# Patient Record
Sex: FEMALE | Race: WHITE | NOT HISPANIC OR LATINO | Employment: OTHER | URBAN - METROPOLITAN AREA
[De-identification: names, ages, dates, MRNs, and addresses within clinical notes are randomized per-mention and may not be internally consistent; named-entity substitution may affect disease eponyms.]

---

## 2023-09-22 PROBLEM — I82.90 VENOUS THROMBOSIS: Status: ACTIVE | Noted: 2023-09-22

## 2023-09-22 PROBLEM — H60.543 ECZEMA OF BOTH EXTERNAL EARS: Status: ACTIVE | Noted: 2023-09-22

## 2023-09-22 PROBLEM — I10 ESSENTIAL HYPERTENSION: Status: ACTIVE | Noted: 2023-09-22

## 2023-09-22 PROBLEM — I34.1 MITRAL VALVE PROLAPSE: Status: ACTIVE | Noted: 2023-09-22

## 2023-09-22 PROBLEM — E83.52 HYPERCALCEMIA: Status: ACTIVE | Noted: 2023-09-22

## 2023-09-22 PROBLEM — E21.0 HYPERPARATHYROIDISM, PRIMARY (MULTI): Status: ACTIVE | Noted: 2023-09-22

## 2023-09-22 PROBLEM — K21.9 GASTROESOPHAGEAL REFLUX DISEASE: Status: ACTIVE | Noted: 2023-09-22

## 2023-09-22 PROBLEM — I87.2 PERIPHERAL VENOUS INSUFFICIENCY: Status: ACTIVE | Noted: 2023-09-22

## 2023-09-22 PROBLEM — R42 VERTIGO: Status: ACTIVE | Noted: 2023-09-22

## 2023-09-22 PROBLEM — R00.2 PALPITATIONS: Status: ACTIVE | Noted: 2023-09-22

## 2023-09-22 PROBLEM — C73 THYROID CARCINOMA (MULTI): Status: ACTIVE | Noted: 2023-09-22

## 2023-09-22 PROBLEM — M47.12 CERVICAL ARTHRITIS WITH MYELOPATHY: Status: ACTIVE | Noted: 2023-09-22

## 2023-09-22 RX ORDER — NAPROXEN SODIUM 220 MG/1
TABLET, FILM COATED ORAL
COMMUNITY

## 2023-09-22 RX ORDER — OXYCODONE AND ACETAMINOPHEN 5; 325 MG/1; MG/1
1 TABLET ORAL EVERY 4 HOURS PRN
COMMUNITY

## 2023-09-22 RX ORDER — ACETAMINOPHEN 500 MG
1 TABLET ORAL DAILY
COMMUNITY

## 2023-09-22 RX ORDER — LANOLIN ALCOHOL/MO/W.PET/CERES
1 CREAM (GRAM) TOPICAL DAILY
COMMUNITY

## 2023-09-22 RX ORDER — SIMVASTATIN 20 MG/1
TABLET, FILM COATED ORAL
COMMUNITY

## 2023-09-22 RX ORDER — LOSARTAN POTASSIUM 50 MG/1
TABLET ORAL
COMMUNITY

## 2023-09-22 RX ORDER — ATENOLOL 50 MG/1
TABLET ORAL
COMMUNITY

## 2023-09-22 RX ORDER — ASPIRIN 325 MG
TABLET, DELAYED RELEASE (ENTERIC COATED) ORAL
COMMUNITY

## 2023-09-22 RX ORDER — SPIRONOLACTONE 25 MG/1
TABLET ORAL
COMMUNITY

## 2023-09-22 RX ORDER — AMOXICILLIN 500 MG/1
TABLET, FILM COATED ORAL
COMMUNITY

## 2023-09-22 RX ORDER — FLUOCINOLONE ACETONIDE 0.1 MG/G
CREAM TOPICAL
COMMUNITY
Start: 2021-10-15

## 2023-10-23 ENCOUNTER — OFFICE VISIT (OUTPATIENT)
Dept: ORTHOPEDIC SURGERY | Facility: CLINIC | Age: 73
End: 2023-10-23
Payer: MEDICARE

## 2023-10-23 ENCOUNTER — APPOINTMENT (OUTPATIENT)
Dept: ORTHOPEDIC SURGERY | Facility: CLINIC | Age: 73
End: 2023-10-23
Payer: MEDICARE

## 2023-10-23 DIAGNOSIS — M47.812 CERVICAL SPONDYLOSIS: Primary | ICD-10-CM

## 2023-10-23 PROCEDURE — 1159F MED LIST DOCD IN RCRD: CPT | Performed by: ORTHOPAEDIC SURGERY

## 2023-10-23 PROCEDURE — 99214 OFFICE O/P EST MOD 30 MIN: CPT | Performed by: ORTHOPAEDIC SURGERY

## 2023-10-23 PROCEDURE — 1125F AMNT PAIN NOTED PAIN PRSNT: CPT | Performed by: ORTHOPAEDIC SURGERY

## 2023-10-23 RX ORDER — ALENDRONATE SODIUM 70 MG/1
TABLET ORAL
COMMUNITY
Start: 2023-03-23

## 2023-10-23 ASSESSMENT — PAIN - FUNCTIONAL ASSESSMENT: PAIN_FUNCTIONAL_ASSESSMENT: 0-10

## 2023-10-23 ASSESSMENT — PAIN SCALES - GENERAL: PAINLEVEL_OUTOF10: 1

## 2023-10-23 ASSESSMENT — PAIN DESCRIPTION - DESCRIPTORS: DESCRIPTORS: SHARP

## 2023-10-23 NOTE — PROGRESS NOTES
Eli returns.  She is a very pleasant 72-year-old woman we had previously evaluated for cervical spondylosis without severe radicular symptoms and no myelopathic features.  She was noted to have significant canal stenosis and spinal cord compression at C4-5 where she has an anterolisthesis.    In the absence of any myelopathic features our plan was observation.    She returns.  She is minimally symptomatic.  She has occasional numbness in the left trapezial region but the radicular symptoms she had previously have dramatically improved.    No weakness.  No loss of coordination.  No gait abnormality.    She has had prior anterior neck surgery for thyroid condition.  She is potentially going to need additional parathyroid surgery.    Family, social, and medical histories are obtained and reviewed.    30-point, patient-recorded Review of Systems is personally obtained and reviewed. Inclusive is no history of weight loss, change in appetite, recent change in activity level, change in bowel or bladder habits, fevers, chills, malaise, or night pain.    On exam very healthy-appearing woman no acute distress.  Stable gait.  Near full motion cervical spine.  Negative Lhermitte's.  Her strength is entirely intact in both upper extremities without pathologic reflexes.    We have again reviewed her MRI.  She does have spondylitic changes with canal stenosis and moderately severe spinal cord compression at C4-5.    Impression: She is not exhibiting any myelopathic features objectively or subjectively.  As such, a nonsurgical approach is reasonable.  I have educated her on things to look for to suggest that this is becoming more of a significant issue for her.  In the unlikely event that she developed symptoms, certainly I would be happy to see her again.    We have discussed that given her anatomy, she is at slightly higher risk of a catastrophic type spinal cord injury.  However, she would like to avoid any surgery which is  understandable.    We will see her back as needed.    ** Dictated with voice recognition software and not immediately reviewed for errors in grammar and/or spelling **

## 2023-10-23 NOTE — LETTER
October 23, 2023     Madyson Sanon MD  [address]  [facility]  [address] [address]    Patient: Eli Vu   YOB: 1950   Date of Visit: 10/23/2023       Dear Dr. Madyson Sanon MD:    Thank you for referring Eli Vu to me for evaluation. Below are my notes for this consultation.  If you have questions, please do not hesitate to call me. I look forward to following your patient along with you.       Sincerely,     Marquis Tabares MD      CC: No Recipients  ______________________________________________________________________________________    Eli returns.  She is a very pleasant 72-year-old woman we had previously evaluated for cervical spondylosis without severe radicular symptoms and no myelopathic features.  She was noted to have significant canal stenosis and spinal cord compression at C4-5 where she has an anterolisthesis.    In the absence of any myelopathic features our plan was observation.    She returns.  She is minimally symptomatic.  She has occasional numbness in the left trapezial region but the radicular symptoms she had previously have dramatically improved.    No weakness.  No loss of coordination.  No gait abnormality.    She has had prior anterior neck surgery for thyroid condition.  She is potentially going to need additional parathyroid surgery.    Family, social, and medical histories are obtained and reviewed.    30-point, patient-recorded Review of Systems is personally obtained and reviewed. Inclusive is no history of weight loss, change in appetite, recent change in activity level, change in bowel or bladder habits, fevers, chills, malaise, or night pain.    On exam very healthy-appearing woman no acute distress.  Stable gait.  Near full motion cervical spine.  Negative Lhermitte's.  Her strength is entirely intact in both upper extremities without pathologic reflexes.    We have again reviewed her MRI.  She does  have spondylitic changes with canal stenosis and moderately severe spinal cord compression at C4-5.    Impression: She is not exhibiting any myelopathic features objectively or subjectively.  As such, a nonsurgical approach is reasonable.  I have educated her on things to look for to suggest that this is becoming more of a significant issue for her.  In the unlikely event that she developed symptoms, certainly I would be happy to see her again.    We have discussed that given her anatomy, she is at slightly higher risk of a catastrophic type spinal cord injury.  However, she would like to avoid any surgery which is understandable.    We will see her back as needed.    ** Dictated with voice recognition software and not immediately reviewed for errors in grammar and/or spelling **

## 2023-11-15 ENCOUNTER — HOSPITAL ENCOUNTER (OUTPATIENT)
Dept: RADIOLOGY | Facility: HOSPITAL | Age: 73
Discharge: HOME | End: 2023-11-15
Payer: MEDICARE

## 2023-11-15 DIAGNOSIS — E21.3 HYPERPARATHYROIDISM, UNSPECIFIED (MULTI): ICD-10-CM

## 2023-11-15 PROCEDURE — 77085 DXA BONE DENSITY AXL VRT FX: CPT

## 2024-04-22 ENCOUNTER — APPOINTMENT (OUTPATIENT)
Dept: ORTHOPEDIC SURGERY | Facility: CLINIC | Age: 74
End: 2024-04-22
Payer: MEDICARE

## 2024-10-15 ENCOUNTER — HOSPITAL ENCOUNTER (OUTPATIENT)
Dept: RADIOLOGY | Facility: HOSPITAL | Age: 74
Discharge: HOME | End: 2024-10-15

## 2024-10-15 DIAGNOSIS — Z13.6 ENCOUNTER FOR SCREENING FOR CARDIOVASCULAR DISORDERS: ICD-10-CM

## 2024-10-15 PROCEDURE — 75571 CT HRT W/O DYE W/CA TEST: CPT

## 2025-07-25 ENCOUNTER — APPOINTMENT (OUTPATIENT)
Dept: OTOLARYNGOLOGY | Facility: CLINIC | Age: 75
End: 2025-07-25
Payer: MEDICARE

## 2025-07-25 VITALS — HEIGHT: 66 IN | BODY MASS INDEX: 31.18 KG/M2 | WEIGHT: 194 LBS

## 2025-07-25 DIAGNOSIS — H93.13 TINNITUS OF BOTH EARS: ICD-10-CM

## 2025-07-25 DIAGNOSIS — E24.0 CUSHING'S DISEASE (MULTI): ICD-10-CM

## 2025-07-25 DIAGNOSIS — C73 THYROID CARCINOMA: ICD-10-CM

## 2025-07-25 DIAGNOSIS — H90.3 SENSORINEURAL HEARING LOSS (SNHL) OF BOTH EARS: Primary | ICD-10-CM

## 2025-07-25 DIAGNOSIS — H61.23 BILATERAL IMPACTED CERUMEN: ICD-10-CM

## 2025-07-25 DIAGNOSIS — E21.0 PRIMARY HYPERPARATHYROIDISM (MULTI): ICD-10-CM

## 2025-07-25 RX ORDER — ONDANSETRON 4 MG/1
4 TABLET, FILM COATED ORAL EVERY 8 HOURS PRN
COMMUNITY
Start: 2025-07-01

## 2025-07-25 RX ORDER — MECLIZINE HYDROCHLORIDE 25 MG/1
25 TABLET ORAL EVERY 6 HOURS PRN
COMMUNITY
Start: 2025-01-03

## 2025-07-25 RX ORDER — ROSUVASTATIN CALCIUM 10 MG/1
10 TABLET, COATED ORAL NIGHTLY
COMMUNITY
Start: 2025-04-21

## 2025-07-25 RX ORDER — HYDROCHLOROTHIAZIDE 25 MG/1
25 TABLET ORAL
COMMUNITY
Start: 2024-10-14

## 2025-07-25 NOTE — PROGRESS NOTES
Chief Complaint   Patient presents with    Cerumen Impaction     LOV: 12/2021 EAR CLEANING, PREFERS NO IRRIGATION      Date of Evaluation: 7/25/2025   Eli was seen today for cerumen impaction.  Diagnoses and all orders for this visit:  Sensorineural hearing loss (SNHL) of both ears (Primary)  Tinnitus of both ears  Bilateral impacted cerumen  Primary hyperparathyroidism (Multi)  Thyroid carcinoma  Cushing's disease (Multi)       PLAN  She will continue taking see endocrinology for her thyroid hyperparathyroid and Cushing's.  Both ears were cleaned today.  She is hearing somewhat better.  I would like an audiogram and follow-up      HPI  She is here today for hearing loss and tinnitus.  I have seen her in the past regarding thyroid papillary microcarcinoma and hyperparathyroidism.  She is following with Dr. Goetz from [facility] endocrinology and was diagnosed with Cushing's disease.  He is also working up the hyperparathyroidism.  Eli Vu is a 74 y.o. female with a history of primary hyperparathyroidism and history of left thyroid lobectomy that had an incidental finding of a micropapillary carcinoma. In excess of 45 minutes was spent reviewing previous operative notes imaging studies including thyroid ultrasound previous sestamibi scan and bone density studies. She is currently asymptomatic. Her right thyroid has been followed with serial ultrasound and continues to show stable subcentimeter colloid cysts and nodules in the right thyroid. These remain unchanged compared with 2019. On the other hand she continues with calcium levels ranging from 9.3-9.6 with a parathyroid hormone level now of 70. She had an updated bone density study that shows worsening osteopenia. Her TSH is 2.84. At the time of her surgery in 2017, Dr. Ayala removed a left upper parathyroid adenoma weighing 340 mg and autotransplanted the left lower parathyroid into the left SCM and marked with surgical clips     Physical Exam:  Last Recorded  "Vitals  Height 1.676 m (5' 6\"), weight 88 kg (194 lb). , Body mass index is 31.31 kg/m².  []General appearance: Well-developed, well-nourished in no acute distress, conversant with normal voice quality    Head/face: No erythema or edema or facial tenderness, and normal facial nerve function bilaterally    External ear: Clear external auditory canals with normal pinnae bilateral large cerumen impactions obstructing the entire ear canal.  Removed with curettes under microscope  Tube status: N/A  Middle ear: Tympanic membranes intact and mobile, middle ears normal.  Tympanic membrane perforation: N/A  Mastoid bowl: N/A  Hearing: Normal conversational awareness at normal speech thresholds    Nose visualized using: Anterior rhinoscopy  Nasal dorsum: Nontraumatic midline appearance  Septum: Midline, nonobstructing  Inferior turbinates: Normal, pink  Secretions: Dry    Oral cavity and oropharynx: Normal  Teeth: Good condition  Floor of mouth: without lesions  Palate: Normal hard palate, soft palate and uvula  Oropharynx: Clear, no lesions present  Buccal mucosa: Normal without masses or lesions  Lips: Normal    Nasopharynx: Inadequate mirror exam secondary to gag/anatomy    Neck:  Salivary glands: Normal bilateral parotid and submandibular glands by inspection and palpation.  Non-thyroid masses: No palpable masses or significant lymphadenopathy  Trachea: Midline  Thyroid: No thyromegaly or palpable nodules  Temporomandibular joint: Nontender  Cervical range of motion: Normal    Neurologic exam: Alert and oriented x3, appropriate affect.  Cranial nerves II-XII normal bilaterally  Extraocular movement: Extraocular movement intact, normal gaze alignment     Medical History[1]   Surgical History[2]       Medications:   Current Outpatient Medications   Medication Instructions    alendronate (Fosamax) 70 mg tablet TAKE 1 TABLET ONCE A WEEK IN THE MORNING WITH CUP OF WATER ON AN EMPTY STOMACH. NOTHING ELSE BY MOUTH AND STAY " UPRIGHT FOR 30 MINUTES    amoxicillin (Amoxil) 500 mg tablet oral    aspirin 81 mg chewable tablet Chew.    atenolol (Tenormin) 50 mg tablet Take by mouth.    cholecalciferol (Vitamin D-3) 1,250 mcg (50,000 unit) capsule oral    cholecalciferol (Vitamin D-3) 5,000 Units tablet 1 tablet, Daily    cyanocobalamin (Vitamin B-12) 1,000 mcg tablet 1 tablet, oral, Daily    fluocinolone 0.01 % cream Apply a small amount to the affected ear canal daily as needed    hydroCHLOROthiazide (HYDRODIURIL) 25 mg, Daily RT    losartan (Cozaar) 50 mg tablet oral    meclizine (ANTIVERT) 25 mg, Every 6 hours PRN    ondansetron (ZOFRAN) 4 mg, Every 8 hours PRN    oxyCODONE-acetaminophen (Percocet) 5-325 mg tablet 1 tablet, oral, Every 4 hours PRN    rosuvastatin (CRESTOR) 10 mg, Nightly    simvastatin (Zocor) 20 mg tablet Take by mouth.    spironolactone (Aldactone) 25 mg tablet oral        Allergies:  Allergies[3]       Efrain Giraldo MD         [1]   Past Medical History:  Diagnosis Date    Arthritis     Bursitis of hip     Cervical disc disorder     CTS (carpal tunnel syndrome)     Disease of thyroid gland     Dizziness     Dupuytren's contracture     Ear problems     Fracture, humerus     Hypertension     Macular degeneration     Obesity     Personal history of diseases of the blood and blood-forming organs and certain disorders involving the immune mechanism     History of autoimmune disorder    Personal history of other diseases of the circulatory system     History of hypertension    Personal history of other diseases of the digestive system     History of gastroesophageal reflux (GERD)    Personal history of other diseases of the musculoskeletal system and connective tissue     History of arthritis    Personal history of other endocrine, nutritional and metabolic disease     History of high cholesterol    Tinnitus    [2]   Past Surgical History:  Procedure Laterality Date    ORIF HUMERUS FRACTURE      OTHER SURGICAL HISTORY   02/02/2021    Elbow surgery    OTHER SURGICAL HISTORY  02/02/2021    Neck surgery    OTHER SURGICAL HISTORY  02/02/2021    Thyroid surgery    OTHER SURGICAL HISTORY  02/02/2021    Parathyroid surgery    PARATHYROID GLAND SURGERY      THYROID SURGERY      TONSILLECTOMY     [3]   Allergies  Allergen Reactions    Ace Inhibitors Cough and Unknown    Amlodipine Swelling    Evista [Raloxifene] Unknown     ELEVATED LIVER ENZYMES    Lisinopril Unknown    Adhesive Tape-Silicones Rash    Mobic [Meloxicam] Other    Nickel Rash

## 2025-10-20 ENCOUNTER — APPOINTMENT (OUTPATIENT)
Dept: AUDIOLOGY | Facility: CLINIC | Age: 75
End: 2025-10-20
Payer: MEDICARE

## 2025-10-20 ENCOUNTER — APPOINTMENT (OUTPATIENT)
Dept: OTOLARYNGOLOGY | Facility: CLINIC | Age: 75
End: 2025-10-20
Payer: MEDICARE